# Patient Record
Sex: FEMALE | Race: WHITE | NOT HISPANIC OR LATINO | URBAN - NONMETROPOLITAN AREA
[De-identification: names, ages, dates, MRNs, and addresses within clinical notes are randomized per-mention and may not be internally consistent; named-entity substitution may affect disease eponyms.]

---

## 2019-02-20 ENCOUNTER — IMPORTED ENCOUNTER (OUTPATIENT)
Dept: URBAN - NONMETROPOLITAN AREA CLINIC 1 | Facility: CLINIC | Age: 29
End: 2019-02-20

## 2019-02-20 PROBLEM — H52.13: Noted: 2019-02-20

## 2019-02-20 PROBLEM — G43.709: Noted: 2019-02-20

## 2019-02-20 PROBLEM — H35.413: Noted: 2019-02-20

## 2019-02-20 PROCEDURE — 92340 FIT SPECTACLES MONOFOCAL: CPT

## 2019-02-20 PROCEDURE — S0620 ROUTINE OPHTHALMOLOGICAL EXA: HCPCS

## 2019-02-20 NOTE — PATIENT DISCUSSION
Myopia-Discussed diagnosis with patient. -Explained that people who are myopic are at a higher risk for developing RD/RT and reviewed associated S&S.-Pt to contact our office if symptoms develop. Lattice OU -Discussed findings with patient-Discussed signs/symptoms of flashes/floaters or blurry curtain coming down to contact the office immediately.  Hx ofc Migraines-Likely no aura-continue to monitor New specs rx given

## 2020-03-11 ENCOUNTER — IMPORTED ENCOUNTER (OUTPATIENT)
Dept: URBAN - NONMETROPOLITAN AREA CLINIC 1 | Facility: CLINIC | Age: 30
End: 2020-03-11

## 2020-03-11 PROBLEM — G43.709: Noted: 2020-03-11

## 2020-03-11 PROBLEM — H35.413: Noted: 2020-03-11

## 2020-03-11 PROBLEM — H52.13: Noted: 2020-03-11

## 2020-03-11 PROCEDURE — 99212 OFFICE O/P EST SF 10 MIN: CPT

## 2021-01-27 NOTE — PATIENT DISCUSSION
Discussed in detail re: nature of condition, dry vs wet AMD, AREDS.  Barbara Risk for CNVM, PED, No fluid.

## 2021-03-16 ENCOUNTER — IMPORTED ENCOUNTER (OUTPATIENT)
Dept: URBAN - NONMETROPOLITAN AREA CLINIC 1 | Facility: CLINIC | Age: 31
End: 2021-03-16

## 2021-03-16 PROBLEM — H35.413: Noted: 2021-03-16

## 2021-03-16 PROBLEM — H52.13: Noted: 2021-03-16

## 2021-03-16 PROCEDURE — S0621 ROUTINE OPHTHALMOLOGICAL EXA: HCPCS

## 2021-03-16 NOTE — PATIENT DISCUSSION
Myopia-Discussed diagnosis with patient. -Explained that people who are myopic are at a higher risk for developing RD/RT and reviewed associated S&S.-Pt to contact our office if symptoms develop. pt pregnant not dilated will do cee in 1 yr

## 2021-05-24 NOTE — PATIENT DISCUSSION
Discussed in detail re: nature of condition, dry vs wet AMD, AREDS.  Barbara Risk for CNVM, No Fluid.

## 2022-04-15 ASSESSMENT — TONOMETRY
OD_IOP_MMHG: 14
OD_IOP_MMHG: 13
OS_IOP_MMHG: 14
OS_IOP_MMHG: 16

## 2022-04-15 ASSESSMENT — VISUAL ACUITY
OS_SC: 20/20
OD_SC: 20/20
OS_CC: 20/80
OD_CC: CF 10'
OS_SC: 20/20
OD_SC: 20/20-1

## 2022-05-02 NOTE — PATIENT DISCUSSION
Discussed in detail re: nature of condition, dry vs wet AMD, AREDS.  High Risk for CNVM, No evidence of CNVM at today's visit.

## 2022-05-02 NOTE — PATIENT DISCUSSION
Patient C/O Decreased/Foggy Vision OS since Friday morning vision. Recent hip surgery possible emboli from surgery ( after surgery patient notice yellow lines in vision in post-op area) vs. progression of dry AMD, no evidence of wet,  some disc elevation temporally, No scalp tenderness, No jaw pain, No evidence of GCA, WILL CHECK 24-2 TO r/o NAION.  Will likely NEED mri BRAIN/ORBIT. check for apd when not dilated and color plates.

## 2025-03-07 ENCOUNTER — COMPREHENSIVE EXAM (OUTPATIENT)
Age: 35
End: 2025-03-07

## 2025-03-07 DIAGNOSIS — H52.223: ICD-10-CM

## 2025-03-07 DIAGNOSIS — G43.909: ICD-10-CM

## 2025-03-07 DIAGNOSIS — H35.413: ICD-10-CM

## 2025-03-07 DIAGNOSIS — H52.13: ICD-10-CM

## 2025-03-07 PROCEDURE — S0621 ROUTINE OPHTHALMOLOGICAL EXA: HCPCS
